# Patient Record
Sex: MALE | Race: WHITE | NOT HISPANIC OR LATINO | Employment: FULL TIME | URBAN - METROPOLITAN AREA
[De-identification: names, ages, dates, MRNs, and addresses within clinical notes are randomized per-mention and may not be internally consistent; named-entity substitution may affect disease eponyms.]

---

## 2019-04-08 ENCOUNTER — OFFICE VISIT (OUTPATIENT)
Dept: URGENT CARE | Facility: CLINIC | Age: 29
End: 2019-04-08

## 2019-04-08 VITALS
SYSTOLIC BLOOD PRESSURE: 140 MMHG | OXYGEN SATURATION: 98 % | WEIGHT: 260 LBS | HEART RATE: 67 BPM | HEIGHT: 72 IN | TEMPERATURE: 98 F | DIASTOLIC BLOOD PRESSURE: 80 MMHG | RESPIRATION RATE: 20 BRPM | BODY MASS INDEX: 35.21 KG/M2

## 2019-04-08 DIAGNOSIS — J01.90 ACUTE SINUSITIS, RECURRENCE NOT SPECIFIED, UNSPECIFIED LOCATION: Primary | ICD-10-CM

## 2019-04-08 DIAGNOSIS — R53.83 FATIGUE, UNSPECIFIED TYPE: ICD-10-CM

## 2019-04-08 PROCEDURE — 99203 OFFICE O/P NEW LOW 30 MIN: CPT | Mod: S$GLB,,, | Performed by: EMERGENCY MEDICINE

## 2019-04-08 PROCEDURE — 99203 PR OFFICE/OUTPT VISIT, NEW, LEVL III, 30-44 MIN: ICD-10-PCS | Mod: S$GLB,,, | Performed by: EMERGENCY MEDICINE

## 2019-04-08 RX ORDER — AZITHROMYCIN 250 MG/1
TABLET, FILM COATED ORAL
Qty: 6 TABLET | Refills: 0 | Status: SHIPPED | OUTPATIENT
Start: 2019-04-08

## 2019-04-08 RX ORDER — FLUOXETINE HYDROCHLORIDE 20 MG/1
40 CAPSULE ORAL DAILY
COMMUNITY

## 2019-04-08 NOTE — PATIENT INSTRUCTIONS
David Keene MD  Go to the Emergency Department for any problems  Call your PCP for follow up next available.    Sinusitis (Antibiotic Treatment)    The sinuses are air-filled spaces within the bones of the face. They connect to the inside of the nose. Sinusitis is an inflammation of the tissue lining the sinus cavity. Sinus inflammation can occur during a cold. It can also be due to allergies to pollens and other particles in the air. Sinusitis can cause symptoms of sinus congestion and fullness. A sinus infection causes fever, headache and facial pain. There is often green or yellow drainage from the nose or into the back of the throat (post-nasal drip). You have been given antibiotics to treat this condition.  Home care:  · Take the full course of antibiotics as instructed. Do not stop taking them, even if you feel better.  · Drink plenty of water, hot tea, and other liquids. This may help thin mucus. It also may promote sinus drainage.  · Heat may help soothe painful areas of the face. Use a towel soaked in hot water. Or,  the shower and direct the hot spray onto your face. Using a vaporizer along with a menthol rub at night may also help.   · An expectorant containing guaifenesin may help thin the mucus and promote drainage from the sinuses.  · Over-the-counter decongestants may be used unless a similar medicine was prescribed. Nasal sprays work the fastest. Use one that contains phenylephrine or oxymetazoline. First blow the nose gently. Then use the spray. Do not use these medicines more often than directed on the label or symptoms may get worse. You may also use tablets containing pseudoephedrine. Avoid products that combine ingredients, because side effects may be increased. Read labels. You can also ask the pharmacist for help. (NOTE: Persons with high blood pressure should not use decongestants. They can raise blood pressure.)  · Over-the-counter antihistamines may help if allergies contributed  to your sinusitis.    · Do not use nasal rinses or irrigation during an acute sinus infection, unless told to by your health care provider. Rinsing may spread the infection to other sinuses.  · Use acetaminophen or ibuprofen to control pain, unless another pain medicine was prescribed. (If you have chronic liver or kidney disease or ever had a stomach ulcer, talk with your doctor before using these medicines. Aspirin should never be used in anyone under 18 years of age who is ill with a fever. It may cause severe liver damage.)  · Don't smoke. This can worsen symptoms.  Follow-up care  Follow up with your healthcare provider or our staff if you are not improving within the next week.  When to seek medical advice  Call your healthcare provider if any of these occur:  · Facial pain or headache becoming more severe  · Stiff neck  · Unusual drowsiness or confusion  · Swelling of the forehead or eyelids  · Vision problems, including blurred or double vision  · Fever of 100.4ºF (38ºC) or higher, or as directed by your healthcare provider  · Seizure  · Breathing problems  · Symptoms not resolving within 10 days  Date Last Reviewed: 4/13/2015  © 6334-2773 [employer]. [address] [place] [address]. All rights reserved. This information is not intended as a substitute for professional medical care. Always follow your healthcare professional's instructions.        Weakness (Uncertain Cause)  Based on your exam today, the exact cause of your weakness is not certain. However, your weakness does not seem to be a sign of a serious illness at this time. Keep an eye on your symptoms and get medical advice as instructed below.  Home care  · Rest at home today. Do not over-exert yourself.  · Take any medicine as prescribed.  · For the next few days, drink extra fluids (unless your healthcare provider wants you to restrict fluids for other reasons). Do not skip meals.  Follow-up care  Follow up with your  healthcare provider or as advised.  When to seek medical advice  Call your healthcare provider for any of the following  · Worsening of your symptoms  · Symptoms don't start getting better within 2 days  · Fever of 100.4º F (38º C) or higher, or as directed by your healthcare provider·    Call 911  Get emergency medical care for any of these:  · Chest, arm, neck, jaw or upper back pain  · Trouble breathing  · Numbness or weakness of the face, one arm or one leg  · Slurred speech, confusion, trouble speaking, walking or seeing  · Blood in vomit or stool (black or red color)  · Loss of consciousness  Date Last Reviewed: 6/10/2015  © 2521-1671 [employer]. [address] [place] [address], [place] [address]. All rights reserved. This information is not intended as a substitute for professional medical care. Always follow your healthcare professional's instructions.        Managing Fatigue     Family members can help with meals and chores around the house.   Fatigue is common. It can be caused by worry, lack of sleep, or poor appetite. Fatigue can also be a sign of anemia, a shortage of red blood cells. You might need medical treatment for anemia. The tips below can help you feel better.  Conserving energy  · Keep track of the times of day when you are most tired and plan around them. For instance, if you are more tired in the afternoon, try to get tasks done in the morning.  · Decide which tasks are most important. Do those first.  · Pass tasks along to others when you need to. Ask for help.  · Accept help when its offered. Tell people what they can do to help. For instance, you may need someone to fix a meal, fold clothes, or put gas in your car.  · Plan rest times. You may want to take a nap each day. Just sitting quietly for a few minutes can make you feel more rested.  What you can do to feel better  · Relax before you try to sleep. Take a bath or read for a while.  · Form a sleep pattern. Go to bed at the  same time each night and get up at the same time each morning.  · Eat well. Choose foods from all of the food groups each day.  · Exercise. Take a brisk walk to help increase your energy.  · Avoid caffeine and alcohol. Drink plenty of water or fruit juices instead.  Treating anemia  If you begin to feel more tired than normal, tell your doctor. Fatigue could be a sign of anemia. This problem is fairly common in cancer patients, especially during chemotherapy and radiation treatments. If your red blood cell count is too low, you may get a blood transfusion. In some cases, you may need medicine to increase the number of red blood cells your body makes.  When to call your healthcare provider  Call your healthcare provider if you have:  · Shortness of breath or chest pain  · A dizzy feeling when you get up from lying or sitting down  · Paler skin than normal  · Extreme tiredness that is not helped by sleep   Date Last Reviewed: 1/3/2016  © 1768-4140 [employer] [address] [place] [address], [place] [address]. All rights reserved. This information is not intended as a substitute for professional medical care. Always follow your healthcare professional's instructions.

## 2019-04-08 NOTE — PROGRESS NOTES
Subjective:       Patient ID: Sunil Craven is a 29 y.o. male.    Vitals:  height is 6' (1.829 m) and weight is 117.9 kg (260 lb). His tympanic temperature is 98.4 °F (36.9 °C). His blood pressure is 140/80 (abnormal) and his pulse is 67. His respiration is 20 and oxygen saturation is 98%.     Chief Complaint: Sinus Problem    Sinus Problem   This is a new problem. The current episode started in the past 7 days. The problem has been gradually worsening since onset. Associated symptoms include chills, congestion, coughing, headaches and sinus pressure. Pertinent negatives include no diaphoresis, ear pain, shortness of breath or sore throat. Past treatments include oral decongestants. The treatment provided mild relief.       Constitution: Positive for chills and generalized weakness. Negative for sweating, fatigue and fever.   HENT: Positive for congestion, postnasal drip and sinus pressure. Negative for ear pain, sinus pain, sore throat and voice change.    Neck: Negative for painful lymph nodes.   Eyes: Negative for eye redness.   Respiratory: Positive for cough. Negative for chest tightness, sputum production, bloody sputum, COPD, shortness of breath, stridor, wheezing and asthma.    Gastrointestinal: Negative for nausea and vomiting.   Musculoskeletal: Negative for muscle ache.   Skin: Negative for rash.   Allergic/Immunologic: Negative for seasonal allergies and asthma.   Neurological: Positive for light-headedness and headaches.   Hematologic/Lymphatic: Negative for swollen lymph nodes.       Objective:      Physical Exam   Constitutional: He is oriented to person, place, and time.   Overweight   HENT:   Head: Normocephalic and atraumatic.   Right Ear: Tympanic membrane, external ear and ear canal normal.   Left Ear: Tympanic membrane, external ear and ear canal normal.   Nose: Mucosal edema present. No rhinorrhea. Right sinus exhibits maxillary sinus tenderness. Right sinus exhibits no frontal sinus  tenderness. Left sinus exhibits maxillary sinus tenderness. Left sinus exhibits no frontal sinus tenderness.   Mouth/Throat: Uvula is midline, oropharynx is clear and moist and mucous membranes are normal.   Eyes: Pupils are equal, round, and reactive to light. EOM are normal. No scleral icterus.   Neck: Normal range of motion. Neck supple.   Cardiovascular: Normal rate, regular rhythm and normal heart sounds.   Pulmonary/Chest: Breath sounds normal.   Musculoskeletal: Normal range of motion.   Lymphadenopathy:     He has no cervical adenopathy.   Neurological: He is alert and oriented to person, place, and time. He exhibits normal muscle tone. Coordination normal.   Skin: Skin is warm and dry.   Psychiatric: He has a normal mood and affect. His behavior is normal.       Assessment:       1. Acute sinusitis, recurrence not specified, unspecified location    2. Fatigue, unspecified type        Plan:         Acute sinusitis, recurrence not specified, unspecified location  -     Ambulatory referral to Internal Medicine  -     azithromycin (ZITHROMAX Z-HE) 250 MG tablet; Take 2 tablets (500 mg) on  Day 1,  followed by 1 tablet (250 mg) once daily on Days 2 through 5.  Dispense: 6 tablet; Refill: 0    Fatigue, unspecified type  -     Ambulatory referral to Internal Medicine        David Keene MD  Go to the Emergency Department for any problems  Call your PCP for follow up next available.

## 2019-04-11 ENCOUNTER — TELEPHONE (OUTPATIENT)
Dept: URGENT CARE | Facility: CLINIC | Age: 29
End: 2019-04-11

## 2019-07-08 PROBLEM — J01.90 ACUTE SINUSITIS: Status: RESOLVED | Noted: 2019-04-08 | Resolved: 2019-07-08

## 2022-02-01 ENCOUNTER — OFFICE VISIT (OUTPATIENT)
Dept: URGENT CARE | Facility: CLINIC | Age: 32
End: 2022-02-01
Payer: COMMERCIAL

## 2022-02-01 VITALS
OXYGEN SATURATION: 98 % | RESPIRATION RATE: 19 BRPM | TEMPERATURE: 99 F | HEART RATE: 83 BPM | DIASTOLIC BLOOD PRESSURE: 82 MMHG | WEIGHT: 260 LBS | HEIGHT: 72 IN | BODY MASS INDEX: 35.21 KG/M2 | SYSTOLIC BLOOD PRESSURE: 142 MMHG

## 2022-02-01 DIAGNOSIS — S39.012A LUMBAR STRAIN, INITIAL ENCOUNTER: Primary | ICD-10-CM

## 2022-02-01 PROCEDURE — 99214 PR OFFICE/OUTPT VISIT, EST, LEVL IV, 30-39 MIN: ICD-10-PCS | Mod: 25,S$GLB,, | Performed by: FAMILY MEDICINE

## 2022-02-01 PROCEDURE — 3077F PR MOST RECENT SYSTOLIC BLOOD PRESSURE >= 140 MM HG: ICD-10-PCS | Mod: CPTII,S$GLB,, | Performed by: FAMILY MEDICINE

## 2022-02-01 PROCEDURE — 3077F SYST BP >= 140 MM HG: CPT | Mod: CPTII,S$GLB,, | Performed by: FAMILY MEDICINE

## 2022-02-01 PROCEDURE — 1159F MED LIST DOCD IN RCRD: CPT | Mod: CPTII,S$GLB,, | Performed by: FAMILY MEDICINE

## 2022-02-01 PROCEDURE — 1159F PR MEDICATION LIST DOCUMENTED IN MEDICAL RECORD: ICD-10-PCS | Mod: CPTII,S$GLB,, | Performed by: FAMILY MEDICINE

## 2022-02-01 PROCEDURE — 1160F PR REVIEW ALL MEDS BY PRESCRIBER/CLIN PHARMACIST DOCUMENTED: ICD-10-PCS | Mod: CPTII,S$GLB,, | Performed by: FAMILY MEDICINE

## 2022-02-01 PROCEDURE — 3079F DIAST BP 80-89 MM HG: CPT | Mod: CPTII,S$GLB,, | Performed by: FAMILY MEDICINE

## 2022-02-01 PROCEDURE — 96372 THER/PROPH/DIAG INJ SC/IM: CPT | Mod: S$GLB,,, | Performed by: FAMILY MEDICINE

## 2022-02-01 PROCEDURE — 1160F RVW MEDS BY RX/DR IN RCRD: CPT | Mod: CPTII,S$GLB,, | Performed by: FAMILY MEDICINE

## 2022-02-01 PROCEDURE — 3079F PR MOST RECENT DIASTOLIC BLOOD PRESSURE 80-89 MM HG: ICD-10-PCS | Mod: CPTII,S$GLB,, | Performed by: FAMILY MEDICINE

## 2022-02-01 PROCEDURE — 96372 PR INJECTION,THERAP/PROPH/DIAG2ST, IM OR SUBCUT: ICD-10-PCS | Mod: S$GLB,,, | Performed by: FAMILY MEDICINE

## 2022-02-01 PROCEDURE — 99214 OFFICE O/P EST MOD 30 MIN: CPT | Mod: 25,S$GLB,, | Performed by: FAMILY MEDICINE

## 2022-02-01 PROCEDURE — 3008F PR BODY MASS INDEX (BMI) DOCUMENTED: ICD-10-PCS | Mod: CPTII,S$GLB,, | Performed by: FAMILY MEDICINE

## 2022-02-01 PROCEDURE — 3008F BODY MASS INDEX DOCD: CPT | Mod: CPTII,S$GLB,, | Performed by: FAMILY MEDICINE

## 2022-02-01 RX ORDER — IBUPROFEN 800 MG/1
800 TABLET ORAL EVERY 8 HOURS PRN
Qty: 30 TABLET | Refills: 1 | Status: SHIPPED | OUTPATIENT
Start: 2022-02-01 | End: 2023-02-01

## 2022-02-01 RX ORDER — KETOROLAC TROMETHAMINE 30 MG/ML
30 INJECTION, SOLUTION INTRAMUSCULAR; INTRAVENOUS
Status: COMPLETED | OUTPATIENT
Start: 2022-02-01 | End: 2022-02-01

## 2022-02-01 RX ORDER — CYCLOBENZAPRINE HCL 10 MG
10 TABLET ORAL 3 TIMES DAILY PRN
Qty: 21 TABLET | Refills: 0 | Status: SHIPPED | OUTPATIENT
Start: 2022-02-01 | End: 2022-02-11

## 2022-02-01 RX ADMIN — KETOROLAC TROMETHAMINE 30 MG: 30 INJECTION, SOLUTION INTRAMUSCULAR; INTRAVENOUS at 09:02

## 2022-02-01 NOTE — PROGRESS NOTES
Subjective:       Patient ID: Sunil Craven is a 31 y.o. male.    Vitals:  height is 6' (1.829 m) and weight is 117.9 kg (260 lb). His temperature is 98.8 °F (37.1 °C). His blood pressure is 142/82 (abnormal) and his pulse is 83. His respiration is 19 and oxygen saturation is 98%.     Chief Complaint: Back Pain (pain and tightness in lower back - Entered by patient)    Back Pain  This is a new problem. The current episode started more than 1 month ago. The problem occurs constantly. The problem has been gradually worsening since onset. The quality of the pain is described as aching. The pain does not radiate. The pain is at a severity of 9/10. The pain is severe. The pain is the same all the time. The symptoms are aggravated by bending. Stiffness is present all day. He has tried nothing for the symptoms. The treatment provided no relief.       Musculoskeletal: Positive for back pain.       Objective:      Physical Exam   Constitutional: He is oriented to person, place, and time. He does not appear ill. No distress.   Cardiovascular: Normal rate and regular rhythm.   Abdominal: Normal appearance.   Musculoskeletal:         General: Tenderness (Paralumbar muscles bilaterally right greater than left. negative straight leg raise) present. No deformity or signs of injury.   Neurological: no focal deficit. He is alert and oriented to person, place, and time. He displays no weakness. Gait normal.   Nursing note and vitals reviewed.        Assessment:       1. Lumbar strain, initial encounter          Plan:         Lumbar strain, initial encounter  -     ketorolac injection 30 mg  -     cyclobenzaprine (FLEXERIL) 10 MG tablet; Take 1 tablet (10 mg total) by mouth 3 (three) times daily as needed.  Dispense: 21 tablet; Refill: 0  -     ibuprofen (ADVIL,MOTRIN) 800 MG tablet; Take 1 tablet (800 mg total) by mouth every 8 (eight) hours as needed for Pain (with food).  Dispense: 30 tablet; Refill: 1    recommended heat

## 2022-02-01 NOTE — PATIENT INSTRUCTIONS
Patient Education       Back Muscle Strain Discharge Instructions   About this topic   A muscle strain happens when a muscle is stretched too much or works too hard. It can also happen if a muscle is stretched too quickly. This is also known as a pulled muscle. When this injury happens in the lower back area, it is a lumbar strain. When this injury happens in your middle or upper back, it is a thoracic strain.  Many people have low back pain at some point and it most often gets better on its own. The doctors may or may not know the exact cause of your pain.       What care is needed at home?   · Ask your doctor what you need to do when you go home. Make sure you ask questions if you do not understand what the doctor says.  · For the first 2 days, put ice on your back a few times a day. Wrap an ice pack in a towel and put it on your back for 10 to 15 minutes at a time. After 2 days, you may want to use heat on your back. Put a heating pad on your back for 20 minutes at a time a few times each day. Never go to sleep with heat or ice on your back.  · Stay as active as you can without causing too much pain. It is OK to rest your back for a day or so. Be sure to get up and move around gently during the day as you are able. After a few days, slowly start to increase your activity level as you are able to. If something causes your pain to come back or get worse, stop and go back to doing easier activities that did not hurt.  · Protect your back. Limit sports, twisting, and heavy lifting until you are fully recovered.  · Do not sit or  one position for a long time. You may want to sleep with a pillow under or between your knees if this eases your pain.  · You may want to take medicine like ibuprofen or naproxen for swelling and pain. These are nonsteroidal anti-inflammatory drugs (NSAIDs).  What follow-up care is needed?   Your doctor may ask you to make visits to the office to check on your progress. Be sure to  keep these visits. Your doctor may send you to physical therapy or a chiropractor to help you heal faster.  What drugs may be needed?   The doctor may order drugs to:  · Help with pain and swelling  · Relax muscles  Will physical activity be limited?   You may need to rest for a while. You should not do physical activity that makes your health problem worse. Talk to your doctor if you run, work out, or play sports. You may not be able to do those things until your health problem gets better.  What can be done to prevent this health problem?   · Take breaks often when sitting or standing for a long time. Walk around when you can.  · Use good posture when you sit or stand. Use proper chairs, beds, and pillows.  · When standing, try putting one leg up on a small step.  · Warm up slowly and stretch before you work out. Use good ways to train, such as slowly adding to how far you run. Do not work out if you are overly tired. Take extra care if working out in cold weather.  · Keep a healthy weight so there is not extra stress on your joints. Eat a healthy diet to keep your muscles healthy.  · Stay active and work out to keep your muscles strong and flexible. Do exercises, like crunches, to strengthen your abdominal muscles. This will help keep your back stable.  · Use good form with your body when lifting heavy objects.  ? Bend your knees.  ? Keep your back straight.  ? Do not twist at your waist. Turn with your feet instead.  ? Keep things close to your body.  · Wear shoes with good support.  · Quit smoking. Smoking can harden the arteries which can lead to back pain and disc problems.  · Avoid stressful situations if you can. Stress can cause muscle tension.  When do I need to call the doctor?   · You are unable to walk or cannot control your bowels or bladder.  · You develop a fever of 100.4°F (38°C) or higher, chills, or night sweats.  · Your legs are numb, weak, or tingly.  · Your pain is getting worse, even with  medicines and rest.  · You feel weak and lightheaded.  · You develop any of the following:  ? Belly pain  ? Throwing up  ? Pain with urination or need to urinate more often  ? Vaginal pain or discharge  ? Rash  Teach Back: Helping You Understand   The Teach Back Method helps you understand the information we are giving you. After you talk with the staff, tell them in your own words what you learned. This helps to make sure the staff has described each thing clearly. It also helps to explain things that may have been confusing. Before going home, make sure you can do these:  · I can tell you about my condition.  · I can tell you what may help ease my pain.  · I can tell you ways to help prevent this from happening again.  · I can tell you what I will do if I have more pain or swelling.  Where can I learn more?   [employer] [place] of Arthritis and Musculoskeletal and Skin Diseases  https://www.niams.nih.gov/health-topics/back-pain   [employer] [place] of Neurological Disorders and Stroke  https://www.ninds.nih.gov/Disorders/Patient-Caregiver-Education/Fact-Sheets/Low-Back-Pain-Fact-Sheet   Last Reviewed Date   2021-06-10  Consumer Information Use and Disclaimer   This information is not specific medical advice and does not replace information you receive from your health care provider. This is only a brief summary of general information. It does NOT include all information about conditions, illnesses, injuries, tests, procedures, treatments, therapies, discharge instructions or life-style choices that may apply to you. You must talk with your health care provider for complete information about your health and treatment options. This information should not be used to decide whether or not to accept your health care providers advice, instructions or recommendations. Only your health care provider has the knowledge and training to provide advice that is right for you.  Copyright   Copyright © 2021 [employer] and  its affiliates and/or licensors. All rights reserved.